# Patient Record
(demographics unavailable — no encounter records)

---

## 2018-05-23 NOTE — 2DMMODE
[place] [address]
Phone:  [phone] 2 D/M-MODE ECHOCARDIOGRAM     
_______________________________________________________________________________
 
Name:         STEF IRIZARRY               Room:          [address] [place] 
[facility]#:    [id number]     Account #:     [id number]  
Admission:    18    Attend Phys:   Nura Hills
Discharge:                Date of Birth: 38  
Date of Service: 18 1555  Report #:      [id number]
        [id number]
_______________________________________________________________________________
THIS REPORT FOR:  //name//                      
 
 
--------------- APPROVED REPORT --------------
 
 
Study performed:  2018 11:27:10
 
EXAM: Comprehensive 2D, Doppler, and color-flow 
Echocardiogram 
Patient Location: In-Patient   
Room #:  [facility]     Status:  routine
 
      BSA:         1.60
HR: 72 bpm BP:          127/47 mmHg 
Rhythm: NSR     
 
Other Information 
Study Quality: Good
 
Indications
Congestive Heart Failure
 
2D Dimensions
   LVEF(%):  65.87 (&gt;50%)
IVSd:  12.21 (7-11mm) LVOT Diam:  19.57 (18-24mm) 
LVDd:  37.06 mm  
PWd:  10.76 (7-11mm) Ascending Ao:  31.54 (22-36mm)
LVDs:  23.88 (25-40mm) 
Aortic Root:  28.87 mm 
   Johnston's LVEF:  65.87 %
 
Volumes
Left Atrial Volume (Systole) 
    LA ESV Index:  27.90 mL/m2
 
Aortic Valve
AoV Peak Delroy.:  1.49 m/s 
AO Peak Gr.:  8.89 mmHg  LVOT Max PG:  3.75 mmHg
AO Mean Gr.:  5.29 mmHg  LVOT Mean P.69 mmHg
    LVOT Max V:  0.97 m/s
AO V2 VTI:  33.34 cm  LVOT Mean V:  0.59 m/s
SAL (VTI):  1.96 cm2  LVOT V1 VTI:  21.79 cm
 
Mitral Valve
    E/A Ratio:  0.82
 
 
[place] [address]
Phone:  [phone]                     2 D/M-MODE ECHOCARDIOGRAM     
_______________________________________________________________________________
 
Name:         STEF IRIZARRY               Room:          [address] [place] 
.R.#:    [id number]     Account #:     [id number]  
Admission:    18    Attend Phys:   Nura Hills
Discharge:                Date of Birth: 38  
Date of Service: 18 1555  Report #:      [id number]
        [id number]
_______________________________________________________________________________
    MV Decel. Time:  192.69 ms
MV E Max Delroy.:  0.92 m/s 
MV PHT:  55.88 ms  
MVA (PHT):  3.94 cm2  
 
TDI
E/Lateral E':  10.22 E/Medial E':  8.36
   Medial E' Delroy.:  0.11 m/s
   Lateral E' Delroy.:  0.09 m/s
 
Pulmonary Valve
PV Peak Delroy.:  0.82 m/s PV Peak Gr.:  2.68 mmHg
 
Tricuspid Valve
TR Peak Gr.:  26.60 mmHg RVSP:  31.00 mmHg
 
Left Ventricle
The left ventricle is normal size. There is normal LV segmental wall 
motion. There is normal left ventricular wall thickness. Left 
ventricular systolic function is normal. The left ventricular 
ejection fraction is within the normal range. LVEF is 60-65%. Grade I 
- abnormal relaxation pattern.
 
Right Ventricle
The right ventricle is normal size. The right ventricular systolic 
function is normal.
 
Atria
The left atrium size is normal. The right atrium size is 
normal.
 
Aortic Valve
Mild aortic valve sclerosis. No aortic regurgitation is present. No 
hemodynamically significant valvular aortic stenosis.
 
Mitral Valve
The mitral valve is normal in structure. Trace mitral regurgitation. 
No evidence of mitral valve stenosis.
 
Tricuspid Valve
The tricuspid valve is normal in structure. Mild tricuspid 
regurgitation. The RVSP is 30-35 mmHg.
 
Pulmonic Valve
The pulmonary valve is normal in structure. There is no pulmonic 
valvular regurgitation.
 
 
[place] [address]
Phone:  [phone]                     2 D/M-MODE ECHOCARDIOGRAM     
_______________________________________________________________________________
 
Name:         IRIZARRYSTEF               Room:          [address] [place] 
[facility]#:    [id number]     Account #:     [id number]  
Admission:    18    Attend Phys:   Nura Hills
Discharge:                Date of Birth: 38  
Date of Service: 18 1555  Report #:      [id number]
        [id number]
_______________________________________________________________________________
 
Great Vessels
The aortic root is normal in size. IVC is normal in size and 
collapses with &gt;50% inspiration
 
Pericardium
There is no pericardial effusion.
 
&lt;Conclusion&gt;
The left ventricle is normal size.
There is normal left ventricular wall thickness.
Left ventricular systolic function is normal.
The left ventricular ejection fraction is within the normal 
range.
LVEF is 60-65%.
Grade I - abnormal relaxation pattern.
The right ventricle is normal size.
The left atrium size is normal.
Mild aortic valve sclerosis.
No aortic regurgitation is present.
No hemodynamically significant valvular aortic stenosis.
The mitral valve is normal in structure.
Trace mitral regurgitation.
Mild tricuspid regurgitation.
The RVSP is 30-35 mmHg.
IVC is normal in size and collapses with &gt;50% inspiration
There is no pericardial effusion.
There is normal LV segmental wall motion.
 
 
 
 
 
 
 
 
 
 
 
 
 
 
 
 
  <ELECTRONICALLY SIGNED>
                                           By: Elkni Calhoun MD, FACC     
  18     1555
D: 18 1555   _____________________________________
T: 18 1555   Elkin Calhoun MD, FACC       /INF

## 2018-05-23 NOTE — EKG
[place] [address]
Phone:  [phone]                     ELECTROCARDIOGRAM REPORT      
_______________________________________________________________________________
 
Name:       STEF IRIZARRY                Room:           [address] [place]  
[facility]#:  [id number]      Account #:      [id number]  
Admission:  18     Attend Phys:    BRAYAN Rocha
Discharge:               Date of Birth:  38  
         Report #: [id number]
    [id number]
_______________________________________________________________________________
THIS REPORT FOR:  //name//                      
 
                         [facility] ED
                                       
Test Date:    2018               Test Time:    14:33:39
Pat Name:     STEF IRIZARRY            Department:   
Patient ID:   [id number]            Room:          
Gender:                               Technician:   DAYANARA CUNNINGHAM
:          1938               Requested By: Diana Stovall
Order Number: [id number] MD:   Elkin Calhoun
                                 Measurements
Intervals                              Axis          
Rate:         66                       P:            75
AZ:           181                      QRS:          11
QRSD:         84                       T:            73
QT:           396                                    
QTc:          415                                    
                           Interpretive Statements
Sinus rhythm
Possible anterior infarct, old
Nonspecific T abnormalities, lateral leads
Compared to ECG 2013 11:21:59
Myocardial infarct finding now present
T-wave abnormality now present
 
Electronically Signed On 2018 16:18:26 CDT by Elkin Calhoun
[email or web address]
 
 
 
 
 
 
 
 
 
 
 
 
 
 
 
 
  <ELECTRONICALLY SIGNED>
                                           By: Elkin Calhoun MD, [facility]     
  18     1618
D: 18 1433   _____________________________________
T: 18 1433   Elkin Calhoun MD, [facility]       /EPI

## 2018-05-29 NOTE — CON
[address]  [address]                    CONSULTATION                  
_______________________________________________________________________________
 
Name:       STEF IRIZARRY                Room:           [address] [place]  
M.R.#:  [id number]      Account #:      [id number]  
Admission:  05/22/18     Attend Phys:    BRAYAN Rocha
Discharge:  05/24/18     Date of Birth:  09/12/38  
         Report #: [id number]
                                                                     [id number]  
_______________________________________________________________________________
THIS REPORT FOR:  //name//                      
 
CC: Anjel Hills
 
DATE OF SERVICE:  05/23/2018
 
 
REASON FOR CONSULTATION:  Pancytopenia.
 
SUBJECTIVE:  This is a 79-year-old  female who has been having
progressive symptoms of fatigue, intermittent fever and decreased energy for the
last 3 weeks.  She has decreased oral intake.  Prior to admission, she had
multiple episodes of lightheadedness.  At the emergency room, she had a CT of
the head, which came back negative for any acute process.  She had a CT
angiogram, which was negative for PE.  Initial CBC revealed that she had white
count of 4.9, however, dropped to 3.8; after IV fluids, her hemoglobin is 12.0,
dropped to 11.1; however, her platelet counts were 38 to up to 46.  On her
differential, absolute neutrophils 1.5.  There was normal RBC.  The patient
today after she received IV fluid, she has been feeling significantly better. 
She denies any bruises.  No nausea, no vomiting, and no abdominal pain.
 
REVIEW OF SYSTEMS:  All systems were reviewed, it was negative except the above.
 
PAST MEDICAL HISTORY:  Hypothyroidism, hypertension, and previous pneumonia.
 
PAST SURGICAL HISTORY:  Cataract surgery and hemorrhoid surgery.
 
SOCIAL HISTORY:  No smoking, no alcohol abuse, and no drug abuse.
 
MEDICATIONS:  Per admission list.
 
FAMILY HISTORY:  Noncontributory.
 
ALLERGIES:  No known allergies.
 
PHYSICAL EXAMINATION:
VITAL SIGNS:  Today, temperature is 36.6, pulse 78, respiration is 15, and blood
pressure is 151/44.
GENERAL:  The patient was sitting in chair, was not in acute distress.
HEENT:  Clear to auscultations bilaterally.
HEART:  Regular rate and rhythm.  S1, S2 within normal limits.
ABDOMEN:  Soft, nontender, nondistended, bowel sounds positive.
EXTREMITIES:  No edema, no cyanosis, and no clubbing.
 
LABORATORY DATA:  As mentioned above.  Platelet count today is 46,000; however,
 
 
 
[place] [address]                    CONSULTATION                  
_______________________________________________________________________________
 
Name:       IRIZARRYSTEF                Room:           M.213-P    [facility] [place]  
M.R.#:  [id number]      Account #:      [id number]  
Admission:  05/22/18     Attend Phys:    BRAYAN Rocha
Discharge:  05/24/18     Date of Birth:  09/12/38  
         Report #: [id number]
                                                                     [id number]  
_______________________________________________________________________________
in the previous records, her platelet count within normal range back in November
2013, at the level of 237.
 
ASSESSMENT AND PLAN:  This is a 79-year-old  female who was evaluated
because of malaise, fevers, _____ consistent with a viral illness.  She was
found to have acute thrombocytopenia, which is most likely due to her viral
illness.  Her PT within normal limits.  She has a very mild elevated PTT. 
Differential did show normal morphology of RBC.  No evidence of schistocytes.  I
would like to obtain a workup including LDH, haptoglobin, direct Virginie test to
rule out any underlying hemolysis.  Also, her B12 and folic acid were within
normal limits back in 2012, we will repeat that in a.m.  In addition to that, we
will check CBC in a.m.  If her platelet count continues to trend up, the next
step will be to observe.  Otherwise, we will consider obtaining a bone marrow
biopsy.  Plan was discussed with the patient.
 
 
 
 
 
 
 
 
 
 
 
 
 
 
 
 
 
 
 
 
 
 
 
 
 
 
 
 
 
 
<ELECTRONICALLY SIGNED>
                                        By:  Kim Lennon MD          
05/29/18     0945
D: 05/23/18 1241_______________________________________
T: 05/23/18 1649Kim Lennon MD             /nt

## 2018-06-05 NOTE — HEMONC
[address]  [address]                    HEMATOLOGY ONCOLOGY NOTE      
_______________________________________________________________________________
 
Name:       STEF IRIZARRY                Room:                      [facility] 
M.R.#:  [id number]      Account #:      [id number]  
Admission:  05/29/18     Attend Phys:    Kim Lennon MD 
Discharge:               Date of Birth:  09/12/38  
         Report #: [id number]
                                                                     [id number]  
_______________________________________________________________________________
THIS REPORT FOR:  //name//                      
 
CC: Anjel Lennon
 
DATE OF SERVICE:  05/29/2018
 
 
HEMATOLOGY/ONCOLOGY CLINIC NOTE
 
REASON FOR VISIT:  Hospital followup for pancytopenia.
 
HISTORY OF PRESENT ILLNESS:  A 79-year-old  female was seen on
05/23/2018 because of the pancytopenia.  She was admitted because of symptoms of
fatigue, fevers and decreased energy.  Her platelet count dropped to the level
of 38,000; however, upon discharge, her platelet count had been trending up. 
Same thing for her white count and hemoglobin.
 
The patient was seen today.  She has been feeling very well.  She denies any
fevers.  Her energy is back to normal.  She denies any bleeding.
 
REVIEW OF SYSTEMS:  All systems reviewed.  It was negative, except the above.
 
PAST MEDICAL HISTORY:  Unchanged from previous visits.
 
SOCIAL HISTORY:  Unchanged from previous visits.
 
FAMILY HISTORY:  Unchanged from previous visits.
 
MEDICATIONS:  Her current medications are losartan 100 mg p.o. daily, Zolpidem
10 mg p.o. daily and levothyroxine 75 mcg p.o. daily.
 
PHYSICAL EXAMINATION:
VITAL SIGNS:  Today, blood pressure is 104/72, pulse is 66, respirations 16,
temperature is 97.3 and pulse is 96% on room air.
GENERAL:  The patient was sitting in chair, was not in acute distress.
LUNGS:  Clear to auscultation bilaterally.
HEART:  Regular rate and rhythm.  S1, S2 within normal limits.
ABDOMEN:  Soft, nontender and nondistended.  Bowel sounds positive.
EXTREMITIES:  No edema, no cyanosis, no clubbing.
 
ASSESSMENT AND PLAN:  A 79-year-old  female was evaluated because of
pancytopenia.  Her workup so far has been negative.  Most likely due to acute
viral illness.  I would like to repeat a followup CBC with differential today. 
 
 
 
[place] [address]                    HEMATOLOGY ONCOLOGY NOTE      
_______________________________________________________________________________
 
Name:       STEF IRIZARRY                Room:                      [facility] 
M.R.#:  [id number]      Account #:      [id number]  
Admission:  05/29/18     Attend Phys:    Kim Lennon MD 
Discharge:               Date of Birth:  09/12/38  
         Report #: [id number]
                                                                     [id number]  
_______________________________________________________________________________
I discussed with the patient if her counts are not recovering, the next step
will be to obtain a bone marrow biopsy; however, this is less likely.
 
 
 
 
 
 
 
 
 
 
 
 
 
 
 
 
 
 
 
 
 
 
 
 
 
 
 
 
 
 
 
 
 
 
 
 
 
 
 
 
 
 
<ELECTRONICALLY SIGNED>
                                        By:  Kim Lennon MD          
06/05/18     2200
D: 05/29/18 1004_______________________________________
T: 05/29/18 1526Momaya Lennon MD             /nt